# Patient Record
Sex: MALE | Race: BLACK OR AFRICAN AMERICAN | NOT HISPANIC OR LATINO | Employment: FULL TIME | URBAN - METROPOLITAN AREA
[De-identification: names, ages, dates, MRNs, and addresses within clinical notes are randomized per-mention and may not be internally consistent; named-entity substitution may affect disease eponyms.]

---

## 2021-09-17 ENCOUNTER — OFFICE VISIT (OUTPATIENT)
Dept: FAMILY MEDICINE | Facility: OTHER | Age: 36
End: 2021-09-17
Payer: COMMERCIAL

## 2021-09-17 VITALS
SYSTOLIC BLOOD PRESSURE: 118 MMHG | DIASTOLIC BLOOD PRESSURE: 80 MMHG | HEART RATE: 64 BPM | OXYGEN SATURATION: 99 % | BODY MASS INDEX: 26.07 KG/M2 | WEIGHT: 192.5 LBS | RESPIRATION RATE: 20 BRPM | HEIGHT: 72 IN | TEMPERATURE: 97.7 F

## 2021-09-17 DIAGNOSIS — Z00.00 ENCOUNTER FOR ROUTINE ADULT HEALTH EXAMINATION WITHOUT ABNORMAL FINDINGS: Primary | ICD-10-CM

## 2021-09-17 DIAGNOSIS — Z13.1 SCREENING FOR DIABETES MELLITUS: ICD-10-CM

## 2021-09-17 DIAGNOSIS — Z13.220 SCREENING FOR LIPOID DISORDERS: ICD-10-CM

## 2021-09-17 DIAGNOSIS — Z23 NEED FOR VACCINATION: ICD-10-CM

## 2021-09-17 PROCEDURE — 99395 PREV VISIT EST AGE 18-39: CPT | Performed by: FAMILY MEDICINE

## 2021-09-17 ASSESSMENT — PAIN SCALES - GENERAL: PAINLEVEL: NO PAIN (0)

## 2021-09-17 ASSESSMENT — MIFFLIN-ST. JEOR: SCORE: 1841.92

## 2021-09-17 NOTE — PROGRESS NOTES
"    Assessment & Plan   Problem List Items Addressed This Visit     None      Visit Diagnoses     Encounter for routine adult health examination without abnormal findings    -  Primary    Screening for lipoid disorders        Relevant Orders    Lipid panel reflex to direct LDL Fasting    Screening for diabetes mellitus        Relevant Orders    Glucose    Need for vaccination        Relevant Orders    TDAP VACCINE (Adacel, Boostrix)  [[id number]] (Completed)       pt will return to complete TDap later.     BMI:   Estimated body mass index is 26.07 kg/m  as calculated from the following:    Height as of this encounter: 1.83 m (6' 0.05\").    Weight as of this encounter: 87.3 kg (192 lb 8 oz).       See Patient Instructions    Return in about 1 year (around 9/17/2022).    María Soto MD  [facility] ELZA Salvador is a 36 year old who presents for the following health issues     Here to establish care. Hasn't seen any doctor in many many years, (he can't recall the last time.)  He states he has no health concerns.     History of Present Illness       He eats 0-1 servings of fruits and vegetables daily.He consumes 0 sweetened beverage(s) daily.He exercises with enough effort to increase his heart rate 20 to 29 minutes per day.    He is not taking prescribed medications regularly due to Not applicable.  Healthy Habits:     Getting at least 3 servings of Calcium per day:  Yes    Bi-annual eye exam:  NO    Dental care twice a year:  NO    Sleep apnea or symptoms of sleep apnea:  None    Diet:  Regular (no restrictions)    Frequency of exercise:  None    Duration of exercise:  N/A    Taking medications regularly:  Not Applicable    Barriers to taking medications:  Not applicable    Medication side effects:  Not applicable    PHQ-2 Total Score: 0    Additional concerns today:  No         Review of Systems   Constitutional, HEENT, cardiovascular, pulmonary, gi and gu systems are negative, " "except as otherwise noted.      Objective    /80   Pulse 64   Temp 97.7  F (36.5  C) (Temporal)   Resp 20   Ht 1.83 m (6' 0.05\")   Wt 87.3 kg (192 lb 8 oz)   SpO2 99%   BMI 26.07 kg/m    Body mass index is 26.07 kg/m .  Physical Exam   GENERAL: healthy, alert and no distress  EYES: Eyes grossly normal to inspection, PERRL and conjunctivae and sclerae normal  HENT: ear canals and TM's normal, nose and mouth without ulcers or lesions  NECK: no adenopathy, no asymmetry, masses, or scars and thyroid normal to palpation  RESP: lungs clear to auscultation - no rales, rhonchi or wheezes  CV: regular rate and rhythm, normal S1 S2, no S3 or S4, no murmur, click or rub, no peripheral edema and peripheral pulses strong  ABDOMEN: soft, nontender, no hepatosplenomegaly, no masses and bowel sounds normal  MS: no gross musculoskeletal defects noted, no edema  SKIN: no suspicious lesions or rashes  NEURO: Normal strength and tone, mentation intact and speech normal  PSYCH: mentation appears normal, affect normal/bright    "

## 2022-06-08 ENCOUNTER — OFFICE VISIT (OUTPATIENT)
Dept: OPHTHALMOLOGY | Facility: CLINIC | Age: 37
End: 2022-06-08
Payer: COMMERCIAL

## 2022-06-08 DIAGNOSIS — H00.14 CHALAZION OF LEFT UPPER EYELID: Primary | ICD-10-CM

## 2022-06-08 PROCEDURE — 67800 REMOVE EYELID LESION: CPT | Mod: E1 | Performed by: OPHTHALMOLOGY

## 2022-06-08 PROCEDURE — 99203 OFFICE O/P NEW LOW 30 MIN: CPT | Mod: 25 | Performed by: OPHTHALMOLOGY

## 2022-06-08 RX ORDER — ERYTHROMYCIN 5 MG/G
OINTMENT OPHTHALMIC ONCE
Status: DISCONTINUED | OUTPATIENT
Start: 2022-06-08 | End: 2022-06-08

## 2022-06-08 RX ORDER — ERYTHROMYCIN 5 MG/G
0.5 OINTMENT OPHTHALMIC 3 TIMES DAILY
Qty: 3.5 G | Refills: 0 | Status: CANCELLED | OUTPATIENT
Start: 2022-06-08

## 2022-06-08 RX ORDER — ERYTHROMYCIN 5 MG/G
OINTMENT OPHTHALMIC ONCE
Status: COMPLETED | OUTPATIENT
Start: 2022-06-08 | End: 2022-06-08

## 2022-06-08 RX ADMIN — ERYTHROMYCIN: 5 OINTMENT OPHTHALMIC at 10:08

## 2022-06-08 ASSESSMENT — TONOMETRY
OD_IOP_MMHG: 12
OS_IOP_MMHG: 9
IOP_METHOD: ICARE

## 2022-06-08 ASSESSMENT — VISUAL ACUITY
OD_CC: 20/20
OD_CC+: -1
OS_CC: 20/25
CORRECTION_TYPE: GLASSES
METHOD: SNELLEN - LINEAR

## 2022-06-08 ASSESSMENT — SLIT LAMP EXAM - LIDS: COMMENTS: NORMAL

## 2022-06-08 ASSESSMENT — EXTERNAL EXAM - LEFT EYE: OS_EXAM: NORMAL

## 2022-06-08 ASSESSMENT — CONF VISUAL FIELD
METHOD: COUNTING FINGERS
OS_SUPERIOR_TEMPORAL_RESTRICTION: 3
OD_NORMAL: 1

## 2022-06-08 ASSESSMENT — EXTERNAL EXAM - RIGHT EYE: OD_EXAM: NORMAL

## 2022-06-08 NOTE — PATIENT INSTRUCTIONS
Use cold compresses for the first 48 hours to minimize bruising and swelling. It works well to put a washcloth in a bowl of ice water and use the cold washcloth.     After 48 hours switch back to using a hot washcloth to clean around your eyelids in the morning and in the evening to prevent new styes from forming.    Apply the prescribed/provided ointment into the eye three times daily for five days. It will make your vision a bit blurry.

## 2022-06-08 NOTE — PROGRESS NOTES
[facility] New Patient    Patient: Modesto Hay MRN# [id number]   YOB: 1985 Age: 37 year old   Date of Visit: Jun 8, 2022         CC: Eyelid lesion    Chief Complaint(s) and History of Present Illness(es)     Stye / Hordeolum Evaluation     Laterality: left upper lid          Comments     Patient presents with bump on left upper lid x 2 months. Pt has tried   drops and ointment with no improvement of the area. Bump has increased in   size over time.               HPI:     Modesto Hay is a 37 year old male who has noted a lesion on the left upper eyelid. It has been present for 2 months. This has not been biopsied.    Enlarging: No  Irritating to eyelashes and catches in folds of skin: Yes  Bleeding: No  Prior cutaneous malignancy: No  Immunosuppression: No           Assessment and Plan:   1. left upper eyelid chalazion  - 7.5mm diamter  - Refractory to medical therapy  Plan  - R/B/A chalazion I&D in procedure room; patient opted to proceed  - Erythromycin ointment TID onto occular surface left eye for 5 days    Cortez Lama PGY2    Chalazion - incise and drain left upper eyelid today.    OPERATIVE NOTE: CHALAZION.    PRE-OPERATIVE DIAGNOSIS: Chalazion left upper lid.    POST-OPERATIVE DIAGNOSIS: Chalazion left upper lid.    PROCEDURE PERFORMED: Incision and drainage of chalazion left upper lid.    SURGEON: Alf Girard    ASSISTANT: MD Cortez Green MD      ANESTHESIA: Local infiltration with 2% lidocaine with epinephrine.    COMPLICATIONS: None    ESTIMATED BLOOD LOSS:  <5mL    HISTORY AND INDICATIONS: Patient presented with an enlarging chalazion in the involved eyelid.  This failed conservative medical management.  After the risks, benefits and alternatives of the proposed procedure were explained, informed consent was obtained.     PROCEDURE: Patient was brought to the minor procedure room and placed supine on the operating table.  Anesthesia was as listed above.  The  area was prepped and draped in the typical fashion.  A chalazion clamp was placed over the involved eyelid and the eyelid everted.  A crutiate incision was made over the chalazion and the lipogranulomatous material removed using the chalazion curette.  Scissors were used to break any septae.  The edges of the cruciate incision were excised using [place] scissors.  Hemostasis was obtained.  The lid was reverted to its normal position, the clamp removed, and the erythromycin antibiotic ointment applied.  The patient tolerated the procedure well and left in stable condition with a tube of antibiotic ointment.     I was present for the entire procedure. Alf Girard MD            Attending Physician Attestation: Complete documentation of historical and exam elements from today's encounter can be found in the full encounter summary report (not reduplicated in this progress note). I personally obtained the chief complaint(s) and history of present illness. I confirmed and edited as necessary the review of systems, past medical/surgical history, family history, social history, and examination findings as documented by others; and I examined the patient myself. I personally reviewed the relevant tests, images, and reports as documented above. I formulated and edited as necessary the assessment and plan and discussed the findings and management plan with the patient. Alf Girard MD    Today with Modesto Hay, I reviewed the indications, risks, benefits, and alternatives of the proposed surgical procedure including, but not limited to, failure obtain the desired result  and need for additional surgery, bleeding, infection, loss of vision, injury to the eye, and the remote possibility of permanent damage to any organ system or death with the use of anesthesia.  I provided multiple opportunities for the questions, answered all questions to the best of my ability, and confirmed that my answers and my discussion  were understood. Alf Girard MD

## 2022-06-08 NOTE — NURSING NOTE
Chief Complaints and History of Present Illnesses   Patient presents with     Stye / Hordeolum Evaluation       Chief Complaint(s) and History of Present Illness(es)     Stye / Hordeolum Evaluation     Laterality: left upper lid              Comments     Patient presents with bump on left upper lid x 2 months. Pt has tried drops and ointment with no improvement of the area. Bump has increased in size over time.                 Cindy Mcnamara, COA